# Patient Record
Sex: MALE | URBAN - METROPOLITAN AREA
[De-identification: names, ages, dates, MRNs, and addresses within clinical notes are randomized per-mention and may not be internally consistent; named-entity substitution may affect disease eponyms.]

---

## 2017-03-30 ENCOUNTER — APPOINTMENT (RX ONLY)
Dept: URBAN - METROPOLITAN AREA CLINIC 17 | Facility: CLINIC | Age: 72
Setting detail: DERMATOLOGY
End: 2017-03-30

## 2017-03-30 DIAGNOSIS — D18.0 HEMANGIOMA: ICD-10-CM

## 2017-03-30 DIAGNOSIS — B35.3 TINEA PEDIS: ICD-10-CM

## 2017-03-30 DIAGNOSIS — L57.8 OTHER SKIN CHANGES DUE TO CHRONIC EXPOSURE TO NONIONIZING RADIATION: ICD-10-CM

## 2017-03-30 DIAGNOSIS — L82.1 OTHER SEBORRHEIC KERATOSIS: ICD-10-CM

## 2017-03-30 DIAGNOSIS — Z71.89 OTHER SPECIFIED COUNSELING: ICD-10-CM

## 2017-03-30 DIAGNOSIS — L57.0 ACTINIC KERATOSIS: ICD-10-CM

## 2017-03-30 DIAGNOSIS — Z85.828 PERSONAL HISTORY OF OTHER MALIGNANT NEOPLASM OF SKIN: ICD-10-CM

## 2017-03-30 PROBLEM — H91.90 UNSPECIFIED HEARING LOSS, UNSPECIFIED EAR: Status: ACTIVE | Noted: 2017-03-30

## 2017-03-30 PROBLEM — D18.01 HEMANGIOMA OF SKIN AND SUBCUTANEOUS TISSUE: Status: ACTIVE | Noted: 2017-03-30

## 2017-03-30 PROCEDURE — ? LIQUID NITROGEN

## 2017-03-30 PROCEDURE — 17004 DESTROY PREMAL LESIONS 15/>: CPT

## 2017-03-30 PROCEDURE — 99213 OFFICE O/P EST LOW 20 MIN: CPT | Mod: 25

## 2017-03-30 PROCEDURE — ? PRESCRIPTION

## 2017-03-30 PROCEDURE — ? COUNSELING

## 2017-03-30 RX ORDER — CICLOPIROX OLAMINE 7.7 MG/G
CREAM TOPICAL
Qty: 1 | Refills: 2 | Status: ERX | COMMUNITY
Start: 2017-03-30

## 2017-03-30 RX ADMIN — CICLOPIROX OLAMINE: 7.7 CREAM TOPICAL at 16:38

## 2017-03-30 ASSESSMENT — LOCATION SIMPLE DESCRIPTION DERM
LOCATION SIMPLE: RIGHT SHOULDER
LOCATION SIMPLE: LEFT FOREHEAD
LOCATION SIMPLE: RIGHT FOREARM
LOCATION SIMPLE: RIGHT PLANTAR SURFACE
LOCATION SIMPLE: CHEST
LOCATION SIMPLE: LEFT HAND
LOCATION SIMPLE: LEFT UPPER BACK
LOCATION SIMPLE: LEFT SCALP
LOCATION SIMPLE: RIGHT FOREHEAD

## 2017-03-30 ASSESSMENT — LOCATION DETAILED DESCRIPTION DERM
LOCATION DETAILED: LEFT LATERAL FOREHEAD
LOCATION DETAILED: RIGHT LATERAL FOREHEAD
LOCATION DETAILED: LEFT MEDIAL FRONTAL SCALP
LOCATION DETAILED: LEFT MID-UPPER BACK
LOCATION DETAILED: LEFT SUPERIOR UPPER BACK
LOCATION DETAILED: RIGHT DISTAL DORSAL FOREARM
LOCATION DETAILED: RIGHT POSTERIOR SHOULDER
LOCATION DETAILED: LEFT ULNAR DORSAL HAND
LOCATION DETAILED: RIGHT MEDIAL INFERIOR CHEST
LOCATION DETAILED: RIGHT PLANTAR FOREFOOT OVERLYING 2ND METATARSAL

## 2017-03-30 ASSESSMENT — LOCATION ZONE DERM
LOCATION ZONE: FEET
LOCATION ZONE: HAND
LOCATION ZONE: FACE
LOCATION ZONE: ARM
LOCATION ZONE: SCALP
LOCATION ZONE: TRUNK

## 2017-03-30 ASSESSMENT — TOTAL NUMBER OF LESIONS: # OF LESIONS?: 38

## 2017-03-30 NOTE — PROCEDURE: LIQUID NITROGEN
Render Post-Care Instructions In Note?: no
Duration Of Freeze Thaw-Cycle (Seconds): 5
Consent: The patient's consent was obtained including but not limited to risks of crusting, scabbing, blistering, scarring, darker or lighter pigmentary change, recurrence, incomplete removal and infection.
Total Number Of Aks Treated: 30
Post-Care Instructions: LIQUID NITROGEN TREATMENT\\n(Cryosurgery)\\n\\n Liquid Nitrogen (LN2) is a cold, liquefied gas with a temperature of 196 degrees below zero Celsius (minus 321 degrees Fahrenheit).  It is used to freeze and destroy superficial skin growths such as warts and keratoses.  It can also be used to treat pre-malignant skin lesions known as actinic keratoses.  LN2 causes stinging and mild pain while the growth is being frozen and then thaws.  The discomfort usually lasts less than fifteen minutes.  On the fingers a throbbing pain will occasionally last  a few hours.\\n After LN2 treatment your skin will become swollen and red and it may blister.  Then a scab will form.  It will fall off by itself in one to three weeks.  The skin growth will come off with the scab, leaving healthy, new skin.\\n Generally, no special care is needed after liquid nitrogen treatment.  Just ignore it.  You can wash your skin as usual and use make-up or other cosmetics.  If clothing irritates the area, cover it with a small bandage (Band-Aid).\\n If a very large or uncomfortable blister develops you may open it with a sterilized needle.  The needle can be sterilized by wiping with rubbing alcohol.  Gently clean the blistered area with soap and water, followed by alcohol.  Insert the needle into the edge of the blister as needed to allow the blister contents to escape.  Press the blister gently to force out the fluid or blood.  This will reduce pain caused by blister fluid pressure.  If a blister re-forms it may be opened again.  Do not remove the top of the blister since its presence helps prevent infection until the new skin beneath can replace it.  Protect open sores or punctured blisters with Polysporin or Bacitracin antibiotic ointment (available without a prescription) under a Band-Aid for 24-48 hours.\\n If you notice any signs of infection such as oozing of a discolored substance (pus), spreading redness, excessive swelling or increased pain usually appearing 2 days or more after the office treatment, please call the office.  Your usual pain reliever (such as Tylenol and aspirin) is normally sufficient to alleviate discomfort.  Intermittent use of an ice pack for 5 to 10 minutes each hour can also be used if needed.  If you have severe pain, please call the office.  Do not pick at crusted areas.  Allow them to come off on their own.\\n If a lesion was treated near your eye, you may notice swelling of one or both lids within 24 hours, so that your lids are partially closed.  This is harmless, will not affect your vision, and will resolve by itself in a day or two.\\n Sometimes LN2 fails or does not completely remove the growth.  This is especially true with larger warts, which often require more than a single treatment for cure.  If the growth is not cured with LN2 and you do not have a scheduled follow-up appointment for further treatment, please call to make a return appointment.
Detail Level: Simple

## 2017-04-04 RX ORDER — CICLOPIROX OLAMINE 7.7 MG/G
CREAM TOPICAL
Qty: 1 | Refills: 0 | Status: ERX

## 2017-09-07 ENCOUNTER — APPOINTMENT (RX ONLY)
Dept: URBAN - METROPOLITAN AREA CLINIC 17 | Facility: CLINIC | Age: 72
Setting detail: DERMATOLOGY
End: 2017-09-07

## 2017-09-07 DIAGNOSIS — L57.0 ACTINIC KERATOSIS: ICD-10-CM

## 2017-09-07 DIAGNOSIS — D18.0 HEMANGIOMA: ICD-10-CM

## 2017-09-07 DIAGNOSIS — Z71.89 OTHER SPECIFIED COUNSELING: ICD-10-CM

## 2017-09-07 DIAGNOSIS — Z85.828 PERSONAL HISTORY OF OTHER MALIGNANT NEOPLASM OF SKIN: ICD-10-CM

## 2017-09-07 DIAGNOSIS — L57.8 OTHER SKIN CHANGES DUE TO CHRONIC EXPOSURE TO NONIONIZING RADIATION: ICD-10-CM

## 2017-09-07 DIAGNOSIS — L82.1 OTHER SEBORRHEIC KERATOSIS: ICD-10-CM

## 2017-09-07 PROBLEM — C44.519 BASAL CELL CARCINOMA OF SKIN OF OTHER PART OF TRUNK: Status: ACTIVE | Noted: 2017-09-07

## 2017-09-07 PROBLEM — D48.5 NEOPLASM OF UNCERTAIN BEHAVIOR OF SKIN: Status: ACTIVE | Noted: 2017-09-07

## 2017-09-07 PROBLEM — D18.01 HEMANGIOMA OF SKIN AND SUBCUTANEOUS TISSUE: Status: ACTIVE | Noted: 2017-09-07

## 2017-09-07 PROCEDURE — ? BIOPSY BY SHAVE METHOD AND DESTRUCTION

## 2017-09-07 PROCEDURE — 17003 DESTRUCT PREMALG LES 2-14: CPT

## 2017-09-07 PROCEDURE — ? LIQUID NITROGEN

## 2017-09-07 PROCEDURE — ? DIAGNOSIS COMMENT

## 2017-09-07 PROCEDURE — 99213 OFFICE O/P EST LOW 20 MIN: CPT | Mod: 25

## 2017-09-07 PROCEDURE — 17272 DSTR MAL LES S/N/H/F/G 1.1-2: CPT | Mod: 59

## 2017-09-07 PROCEDURE — ? COUNSELING

## 2017-09-07 PROCEDURE — ? PRESCRIPTION

## 2017-09-07 PROCEDURE — 17000 DESTRUCT PREMALG LESION: CPT

## 2017-09-07 RX ORDER — FLUOROURACIL 2 G/40G
CREAM TOPICAL BID
Qty: 1 | Refills: 3 | Status: ERX | COMMUNITY
Start: 2017-09-07

## 2017-09-07 RX ADMIN — FLUOROURACIL: 2 CREAM TOPICAL at 16:09

## 2017-09-07 ASSESSMENT — LOCATION ZONE DERM
LOCATION ZONE: ARM
LOCATION ZONE: FACE
LOCATION ZONE: TRUNK

## 2017-09-07 ASSESSMENT — LOCATION DETAILED DESCRIPTION DERM
LOCATION DETAILED: RIGHT POSTERIOR SHOULDER
LOCATION DETAILED: RIGHT INFERIOR CENTRAL MALAR CHEEK
LOCATION DETAILED: LEFT CENTRAL MALAR CHEEK
LOCATION DETAILED: LEFT INFERIOR MEDIAL FOREHEAD
LOCATION DETAILED: LEFT SUPERIOR UPPER BACK
LOCATION DETAILED: LEFT MID-UPPER BACK
LOCATION DETAILED: RIGHT MEDIAL INFERIOR CHEST

## 2017-09-07 ASSESSMENT — LOCATION SIMPLE DESCRIPTION DERM
LOCATION SIMPLE: RIGHT CHEEK
LOCATION SIMPLE: LEFT FOREHEAD
LOCATION SIMPLE: LEFT UPPER BACK
LOCATION SIMPLE: CHEST
LOCATION SIMPLE: RIGHT SHOULDER
LOCATION SIMPLE: LEFT CHEEK

## 2017-09-07 ASSESSMENT — TOTAL NUMBER OF LESIONS: # OF LESIONS?: 20

## 2017-09-07 NOTE — PROCEDURE: DIAGNOSIS COMMENT
Comment: Suspect recurrence.  Large lesion.  Opt for chemotx rather than excision.
Detail Level: Simple

## 2017-09-07 NOTE — PROCEDURE: LIQUID NITROGEN
Detail Level: Simple
Render Post-Care Instructions In Note?: no
Post-Care Instructions: LIQUID NITROGEN TREATMENT\\n(Cryosurgery)\\n\\n Liquid Nitrogen (LN2) is a cold, liquefied gas with a temperature of 196 degrees below zero Celsius (minus 321 degrees Fahrenheit).  It is used to freeze and destroy superficial skin growths such as warts and keratoses.  It can also be used to treat pre-malignant skin lesions known as actinic keratoses.  LN2 causes stinging and mild pain while the growth is being frozen and then thaws.  The discomfort usually lasts less than fifteen minutes.  On the fingers a throbbing pain will occasionally last  a few hours.\\n After LN2 treatment your skin will become swollen and red and it may blister.  Then a scab will form.  It will fall off by itself in one to three weeks.  The skin growth will come off with the scab, leaving healthy, new skin.\\n Generally, no special care is needed after liquid nitrogen treatment.  Just ignore it.  You can wash your skin as usual and use make-up or other cosmetics.  If clothing irritates the area, cover it with a small bandage (Band-Aid).\\n If a very large or uncomfortable blister develops you may open it with a sterilized needle.  The needle can be sterilized by wiping with rubbing alcohol.  Gently clean the blistered area with soap and water, followed by alcohol.  Insert the needle into the edge of the blister as needed to allow the blister contents to escape.  Press the blister gently to force out the fluid or blood.  This will reduce pain caused by blister fluid pressure.  If a blister re-forms it may be opened again.  Do not remove the top of the blister since its presence helps prevent infection until the new skin beneath can replace it.  Protect open sores or punctured blisters with Polysporin or Bacitracin antibiotic ointment (available without a prescription) under a Band-Aid for 24-48 hours.\\n If you notice any signs of infection such as oozing of a discolored substance (pus), spreading redness, excessive swelling or increased pain usually appearing 2 days or more after the office treatment, please call the office.  Your usual pain reliever (such as Tylenol and aspirin) is normally sufficient to alleviate discomfort.  Intermittent use of an ice pack for 5 to 10 minutes each hour can also be used if needed.  If you have severe pain, please call the office.  Do not pick at crusted areas.  Allow them to come off on their own.\\n If a lesion was treated near your eye, you may notice swelling of one or both lids within 24 hours, so that your lids are partially closed.  This is harmless, will not affect your vision, and will resolve by itself in a day or two.\\n Sometimes LN2 fails or does not completely remove the growth.  This is especially true with larger warts, which often require more than a single treatment for cure.  If the growth is not cured with LN2 and you do not have a scheduled follow-up appointment for further treatment, please call to make a return appointment.
Consent: The patient's consent was obtained including but not limited to risks of crusting, scabbing, blistering, scarring, darker or lighter pigmentary change, recurrence, incomplete removal and infection.
Total Number Of Aks Treated: 10
Duration Of Freeze Thaw-Cycle (Seconds): 5

## 2018-04-02 ENCOUNTER — APPOINTMENT (RX ONLY)
Dept: URBAN - METROPOLITAN AREA CLINIC 17 | Facility: CLINIC | Age: 73
Setting detail: DERMATOLOGY
End: 2018-04-02

## 2018-04-02 DIAGNOSIS — L81.4 OTHER MELANIN HYPERPIGMENTATION: ICD-10-CM

## 2018-04-02 DIAGNOSIS — Z71.89 OTHER SPECIFIED COUNSELING: ICD-10-CM

## 2018-04-02 DIAGNOSIS — Z85.828 PERSONAL HISTORY OF OTHER MALIGNANT NEOPLASM OF SKIN: ICD-10-CM

## 2018-04-02 DIAGNOSIS — D18.0 HEMANGIOMA: ICD-10-CM

## 2018-04-02 DIAGNOSIS — L82.1 OTHER SEBORRHEIC KERATOSIS: ICD-10-CM

## 2018-04-02 DIAGNOSIS — L57.0 ACTINIC KERATOSIS: ICD-10-CM

## 2018-04-02 PROBLEM — D18.01 HEMANGIOMA OF SKIN AND SUBCUTANEOUS TISSUE: Status: ACTIVE | Noted: 2018-04-02

## 2018-04-02 PROCEDURE — ? ORDER FOR PHOTODYNAMIC THERAPY

## 2018-04-02 PROCEDURE — 99213 OFFICE O/P EST LOW 20 MIN: CPT | Mod: 25

## 2018-04-02 PROCEDURE — ? LIQUID NITROGEN

## 2018-04-02 PROCEDURE — ? PRESCRIPTION

## 2018-04-02 PROCEDURE — ? COUNSELING

## 2018-04-02 PROCEDURE — 17003 DESTRUCT PREMALG LES 2-14: CPT

## 2018-04-02 PROCEDURE — 17000 DESTRUCT PREMALG LESION: CPT

## 2018-04-02 RX ORDER — EMOLLIENT COMBINATION NO.10
EMULSION (GRAM) TOPICAL
Qty: 1 | Refills: 1 | COMMUNITY
Start: 2018-04-02

## 2018-04-02 RX ADMIN — Medication: at 17:51

## 2018-04-02 ASSESSMENT — LOCATION ZONE DERM
LOCATION ZONE: FACE
LOCATION ZONE: ARM
LOCATION ZONE: TRUNK
LOCATION ZONE: TRUNK

## 2018-04-02 ASSESSMENT — LOCATION DETAILED DESCRIPTION DERM
LOCATION DETAILED: RIGHT POSTERIOR SHOULDER
LOCATION DETAILED: LEFT MID-UPPER BACK
LOCATION DETAILED: RIGHT SUPERIOR UPPER BACK
LOCATION DETAILED: RIGHT MEDIAL INFERIOR CHEST
LOCATION DETAILED: LEFT SUPERIOR UPPER BACK
LOCATION DETAILED: SUPERIOR MID FOREHEAD
LOCATION DETAILED: LEFT INFERIOR LATERAL MALAR CHEEK
LOCATION DETAILED: RIGHT CENTRAL MALAR CHEEK

## 2018-04-02 ASSESSMENT — LOCATION SIMPLE DESCRIPTION DERM
LOCATION SIMPLE: LEFT CHEEK
LOCATION SIMPLE: SUPERIOR FOREHEAD
LOCATION SIMPLE: CHEST
LOCATION SIMPLE: RIGHT CHEEK
LOCATION SIMPLE: RIGHT UPPER BACK
LOCATION SIMPLE: RIGHT SHOULDER
LOCATION SIMPLE: LEFT UPPER BACK

## 2018-04-02 ASSESSMENT — TOTAL NUMBER OF LESIONS: # OF LESIONS?: 100

## 2018-04-02 NOTE — PROCEDURE: ORDER FOR PHOTODYNAMIC THERAPY
Pdt Type: JAMMIE-U
Frequency Of Pdt: Every 12 months
Arms Incubation Time: 2 Hours
Consent: The procedure and risks were reviewed with the patient including but not limited to: burning, pigmentary changes, pain, blistering, scabbing, redness, and the possibility of needing numerous treatments. Strict photoprotection after the procedure was also discussed.
Location: Face
Detail Level: Zone
Face And Scalp Incubation Time: 1 Hour for the face and 2 Hours for the scalp
Face And Neck Incubation Time: 1 Hour

## 2018-07-31 ENCOUNTER — APPOINTMENT (RX ONLY)
Dept: URBAN - METROPOLITAN AREA CLINIC 17 | Facility: CLINIC | Age: 73
Setting detail: DERMATOLOGY
End: 2018-07-31

## 2018-07-31 DIAGNOSIS — L82.1 OTHER SEBORRHEIC KERATOSIS: ICD-10-CM

## 2018-07-31 DIAGNOSIS — L57.0 ACTINIC KERATOSIS: ICD-10-CM

## 2018-07-31 DIAGNOSIS — L81.4 OTHER MELANIN HYPERPIGMENTATION: ICD-10-CM

## 2018-07-31 DIAGNOSIS — Z71.89 OTHER SPECIFIED COUNSELING: ICD-10-CM

## 2018-07-31 DIAGNOSIS — D18.0 HEMANGIOMA: ICD-10-CM

## 2018-07-31 DIAGNOSIS — Z85.828 PERSONAL HISTORY OF OTHER MALIGNANT NEOPLASM OF SKIN: ICD-10-CM

## 2018-07-31 PROBLEM — D18.01 HEMANGIOMA OF SKIN AND SUBCUTANEOUS TISSUE: Status: ACTIVE | Noted: 2018-07-31

## 2018-07-31 PROCEDURE — ? LIQUID NITROGEN

## 2018-07-31 PROCEDURE — 17003 DESTRUCT PREMALG LES 2-14: CPT

## 2018-07-31 PROCEDURE — ? COUNSELING

## 2018-07-31 PROCEDURE — 17000 DESTRUCT PREMALG LESION: CPT

## 2018-07-31 PROCEDURE — 99213 OFFICE O/P EST LOW 20 MIN: CPT | Mod: 25

## 2018-07-31 ASSESSMENT — LOCATION SIMPLE DESCRIPTION DERM
LOCATION SIMPLE: LEFT UPPER BACK
LOCATION SIMPLE: POSTERIOR SCALP
LOCATION SIMPLE: RIGHT UPPER BACK
LOCATION SIMPLE: CHEST
LOCATION SIMPLE: LEFT CHEEK
LOCATION SIMPLE: RIGHT SHOULDER

## 2018-07-31 ASSESSMENT — LOCATION DETAILED DESCRIPTION DERM
LOCATION DETAILED: LEFT MID-UPPER BACK
LOCATION DETAILED: RIGHT POSTERIOR SHOULDER
LOCATION DETAILED: LEFT SUPERIOR UPPER BACK
LOCATION DETAILED: RIGHT SUPERIOR UPPER BACK
LOCATION DETAILED: RIGHT MEDIAL INFERIOR CHEST
LOCATION DETAILED: POSTERIOR MID-PARIETAL SCALP
LOCATION DETAILED: LEFT CENTRAL MALAR CHEEK

## 2018-07-31 ASSESSMENT — LOCATION ZONE DERM
LOCATION ZONE: ARM
LOCATION ZONE: TRUNK
LOCATION ZONE: FACE
LOCATION ZONE: TRUNK
LOCATION ZONE: SCALP

## 2018-07-31 ASSESSMENT — TOTAL NUMBER OF LESIONS: # OF LESIONS?: 13

## 2018-07-31 NOTE — PROCEDURE: LIQUID NITROGEN
Post-Care Instructions: LIQUID NITROGEN TREATMENT\\n(Cryosurgery)\\n\\n Liquid Nitrogen (LN2) is a cold, liquefied gas with a temperature of 196 degrees below zero Celsius (minus 321 degrees Fahrenheit).  It is used to freeze and destroy superficial skin growths such as warts and keratoses.  It can also be used to treat pre-malignant skin lesions known as actinic keratoses.  LN2 causes stinging and mild pain while the growth is being frozen and then thaws.  The discomfort usually lasts less than fifteen minutes.  On the fingers a throbbing pain will occasionally last  a few hours.\\n After LN2 treatment your skin will become swollen and red and it may blister.  Then a scab will form.  It will fall off by itself in one to three weeks.  The skin growth will come off with the scab, leaving healthy, new skin.\\n Generally, no special care is needed after liquid nitrogen treatment.  Just ignore it.  You can wash your skin as usual and use make-up or other cosmetics.  If clothing irritates the area, cover it with a small bandage (Band-Aid).\\n If a very large or uncomfortable blister develops you may open it with a sterilized needle.  The needle can be sterilized by wiping with rubbing alcohol.  Gently clean the blistered area with soap and water, followed by alcohol.  Insert the needle into the edge of the blister as needed to allow the blister contents to escape.  Press the blister gently to force out the fluid or blood.  This will reduce pain caused by blister fluid pressure.  If a blister re-forms it may be opened again.  Do not remove the top of the blister since its presence helps prevent infection until the new skin beneath can replace it.  Protect open sores or punctured blisters with Polysporin or Bacitracin antibiotic ointment (available without a prescription) under a Band-Aid for 24-48 hours.\\n If you notice any signs of infection such as oozing of a discolored substance (pus), spreading redness, excessive swelling or increased pain usually appearing 2 days or more after the office treatment, please call the office.  Your usual pain reliever (such as Tylenol and aspirin) is normally sufficient to alleviate discomfort.  Intermittent use of an ice pack for 5 to 10 minutes each hour can also be used if needed.  If you have severe pain, please call the office.  Do not pick at crusted areas.  Allow them to come off on their own.\\n If a lesion was treated near your eye, you may notice swelling of one or both lids within 24 hours, so that your lids are partially closed.  This is harmless, will not affect your vision, and will resolve by itself in a day or two.\\n Sometimes LN2 fails or does not completely remove the growth.  This is especially true with larger warts, which often require more than a single treatment for cure.  If the growth is not cured with LN2 and you do not have a scheduled follow-up appointment for further treatment, please call to make a return appointment.
Duration Of Freeze Thaw-Cycle (Seconds): 5
Render Post-Care Instructions In Note?: no
Total Number Of Aks Treated: 13
Detail Level: Simple
Consent: The patient's consent was obtained including but not limited to risks of crusting, scabbing, blistering, scarring, darker or lighter pigmentary change, recurrence, incomplete removal and infection.

## 2019-09-22 NOTE — PROCEDURE: LIQUID NITROGEN
Duration Of Freeze Thaw-Cycle (Seconds): 5
Render Post-Care Instructions In Note?: no
Total Number Of Aks Treated: 10
Post-Care Instructions: LIQUID NITROGEN TREATMENT\\n(Cryosurgery)\\n\\n Liquid Nitrogen (LN2) is a cold, liquefied gas with a temperature of 196 degrees below zero Celsius (minus 321 degrees Fahrenheit).  It is used to freeze and destroy superficial skin growths such as warts and keratoses.  It can also be used to treat pre-malignant skin lesions known as actinic keratoses.  LN2 causes stinging and mild pain while the growth is being frozen and then thaws.  The discomfort usually lasts less than fifteen minutes.  On the fingers a throbbing pain will occasionally last  a few hours.\\n After LN2 treatment your skin will become swollen and red and it may blister.  Then a scab will form.  It will fall off by itself in one to three weeks.  The skin growth will come off with the scab, leaving healthy, new skin.\\n Generally, no special care is needed after liquid nitrogen treatment.  Just ignore it.  You can wash your skin as usual and use make-up or other cosmetics.  If clothing irritates the area, cover it with a small bandage (Band-Aid).\\n If a very large or uncomfortable blister develops you may open it with a sterilized needle.  The needle can be sterilized by wiping with rubbing alcohol.  Gently clean the blistered area with soap and water, followed by alcohol.  Insert the needle into the edge of the blister as needed to allow the blister contents to escape.  Press the blister gently to force out the fluid or blood.  This will reduce pain caused by blister fluid pressure.  If a blister re-forms it may be opened again.  Do not remove the top of the blister since its presence helps prevent infection until the new skin beneath can replace it.  Protect open sores or punctured blisters with Polysporin or Bacitracin antibiotic ointment (available without a prescription) under a Band-Aid for 24-48 hours.\\n If you notice any signs of infection such as oozing of a discolored substance (pus), spreading redness, excessive swelling or increased pain usually appearing 2 days or more after the office treatment, please call the office.  Your usual pain reliever (such as Tylenol and aspirin) is normally sufficient to alleviate discomfort.  Intermittent use of an ice pack for 5 to 10 minutes each hour can also be used if needed.  If you have severe pain, please call the office.  Do not pick at crusted areas.  Allow them to come off on their own.\\n If a lesion was treated near your eye, you may notice swelling of one or both lids within 24 hours, so that your lids are partially closed.  This is harmless, will not affect your vision, and will resolve by itself in a day or two.\\n Sometimes LN2 fails or does not completely remove the growth.  This is especially true with larger warts, which often require more than a single treatment for cure.  If the growth is not cured with LN2 and you do not have a scheduled follow-up appointment for further treatment, please call to make a return appointment.
Consent: The patient's consent was obtained including but not limited to risks of crusting, scabbing, blistering, scarring, darker or lighter pigmentary change, recurrence, incomplete removal and infection.
Detail Level: Simple
[facility]

## 2025-04-24 NOTE — PROCEDURE: BIOPSY BY SHAVE METHOD AND DESTRUCTION
Notification Instructions: Patient will be notified of biopsy results if findings are unanticipated or mandate sooner intervention.  Patient may call in 10 days for results as desired.
Size Of Lesion In Cm (Optional): 0
Billing Type: Third-Party Bill
Wound Care: Petrolatum
Number Of Curettages: 3
Biopsy Type: H and E
Anesthesia Volume In Cc: 0.5
Bill As?: Malignant Destruction
Hemostasis: Drysol
Bill 87916 For Specimen Handling/Conveyance To Laboratory?: no
oral
Detail Level: Detailed
Anesthesia Type: 1% lidocaine with epinephrine
Destruction Type: electrodesiccation
Consent: Before starting the procedure, the following were discussed with the patient and verbal consent was obtained:  the nature of the patient’s condition, the nature of the proposed treatment options, the probability of therapeutic success, reasonable alternatives, and the material risks of the recommended procedure.\\n\\nRisks that were reviewed included but were not limited to scarring, infection, bleeding, scabbing, incomplete removal, nerve damage and allergy to anesthesia.
Size Of Lesion After Curettage: 1.1
Post-Care Instructions: I reviewed with the patient in detail post-care instructions. Patient is to keep the biopsy site dry overnight, and then apply bacitracin twice daily until healed.